# Patient Record
Sex: FEMALE | Race: WHITE
[De-identification: names, ages, dates, MRNs, and addresses within clinical notes are randomized per-mention and may not be internally consistent; named-entity substitution may affect disease eponyms.]

---

## 2018-04-09 ENCOUNTER — HOSPITAL ENCOUNTER (EMERGENCY)
Dept: HOSPITAL 17 - NED | Age: 53
Discharge: LEFT BEFORE DISCHARGE | End: 2018-04-09
Payer: COMMERCIAL

## 2018-04-09 VITALS — WEIGHT: 130.27 LBS | HEIGHT: 70 IN | BODY MASS INDEX: 18.65 KG/M2

## 2018-04-09 VITALS
TEMPERATURE: 97.8 F | OXYGEN SATURATION: 98 % | RESPIRATION RATE: 18 BRPM | SYSTOLIC BLOOD PRESSURE: 151 MMHG | HEART RATE: 114 BPM | DIASTOLIC BLOOD PRESSURE: 95 MMHG

## 2018-04-09 DIAGNOSIS — R42: ICD-10-CM

## 2018-04-09 DIAGNOSIS — M25.552: Primary | ICD-10-CM

## 2018-04-09 PROCEDURE — 99284 EMERGENCY DEPT VISIT MOD MDM: CPT

## 2018-04-09 PROCEDURE — 72170 X-RAY EXAM OF PELVIS: CPT

## 2018-04-09 PROCEDURE — 71046 X-RAY EXAM CHEST 2 VIEWS: CPT

## 2018-04-09 PROCEDURE — 73552 X-RAY EXAM OF FEMUR 2/>: CPT

## 2018-04-09 NOTE — RADRPT
EXAM DATE/TIME:  04/09/2018 19:22 

 

[facility] COMPARISON:     

No previous studies available for comparison.

 

                     

INDICATIONS :     

Syncope. 

                     

 

MEDICAL HISTORY :     

None.          

 

SURGICAL HISTORY :        

ORIF left hip.

 

ENCOUNTER:     

Initial                                        

 

ACUITY:     

1 day      

 

PAIN SCORE:     

Non-responsive.

 

LOCATION:     

Bilateral chest 

 

FINDINGS:     

PA and lateral views of the chest demonstrate the lungs to be symmetrically aerated without evidence 
of mass, infiltrate or effusion.  The cardiomediastinal contours are unremarkable.  Osseous structure
s are intact.

 

CONCLUSION:     No acute disease.  

 

 

 

 Frank Smith MD on April 09, 2018 at 20:06           

Board Certified Radiologist.

 This report was verified electronically.

## 2018-04-09 NOTE — RADRPT
EXAM DATE/TIME:  04/09/2018 19:27 

 

[facility] COMPARISON:     

No previous studies available for comparison.

 

                     

INDICATIONS :     

Left sided hip pain after fall.

                     

 

MEDICAL HISTORY :     

None.          

 

SURGICAL HISTORY :        

ORIF left hip.

 

ENCOUNTER:     

Initial                                        

 

ACUITY:     

1 day      

 

PAIN SCORE:     

4/10

 

LOCATION:       

Pelvis.

 

FINDINGS:     

A single frontal view of the pelvis demonstrates lag screw fixation proximal left femur. No acute fra
cture or dislocation. No bony destructive changes.

 

CONCLUSION:     

1. Fixation proximal left femur. No acute bony abnormality is present.

 

 

 

 Frank Smith MD on April 09, 2018 at 20:07           

Board Certified Radiologist.

 This report was verified electronically.

## 2018-04-09 NOTE — PD
Physical Exam


Date Seen by Provider:  Apr 9, 2018


Time Seen by Provider:  21:14


Narrative


52-year-old female presents to the emergency department stating that she has 

been dizzy and falling for 6 months.  She states she was seen previously at 

[facility] in December for the same.  She states she has been having left 

hip pain with history of fracture.  She is ambulatory in triage.  She states 

that her pain has been ongoing since December.  Patient states that she would 

like to be admitted and is requesting admission.  Moderate severity.  Current 

pain is 7/10.





Data


Data


Last Documented VS





Vital Signs








  Date Time  Temp Pulse Resp B/P (MAP) Pulse Ox O2 Delivery O2 Flow Rate FiO2


 


4/9/18 18:40 97.8 114 18 151/95 (113) 98   








Orders





 Orders


Complete Blood Count With Diff (4/9/18 18:46)


Comprehensive Metabolic Panel (4/9/18 18:46)


Magnesium (Mg) (4/9/18 18:46)


Ckmb (Isoenzyme) Profile (4/9/18 18:46)


Troponin I (4/9/18 18:46)


Act Partial Throm Time (Ptt) (4/9/18 18:46)


Prothrombin Time / Inr (Pt) (4/9/18 18:46)


Urinalysis - C+S If Indicated (4/9/18 18:46)


Chest, Pa & Lat (4/9/18 18:46)


Femur (Ap & Lat/2vws) (4/9/18 )


Lipase (4/9/18 18:46)


Pelvis, Ap Only (Routine) (4/9/18 )








MDM


Supervised Visit with KARYNA:  No


Narrative Course


52-year-old female presents to the emergency department for evaluation of 

dizziness and falling for 6 months.  Patient was initially seen in triage.  

Workup is initiated.  Patient did leave AGAINST MEDICAL ADVICE before she could 

be placed into a medical bed.


Diagnosis





 Primary Impression:  


 Left against medical advice


Disposition:  07 AGAINST MEDICAL ADVICE











Zora Schaefer Apr 9, 2018 21:47

## 2018-04-09 NOTE — RADRPT
EXAM DATE/TIME:  04/09/2018 19:28 

 

[facility] COMPARISON:     

No previous studies available for comparison.

 

                     

INDICATIONS :     

Left hip pain after fall.

                     

 

MEDICAL HISTORY :     

None.          

 

SURGICAL HISTORY :        

ORIF left hip.

 

ENCOUNTER:     

Initial                                        

 

ACUITY:     

1 day      

 

PAIN SCORE:     

5/10

 

LOCATION:     

Left  femur.

 

FINDINGS:     

Two view examination of the left femur demonstrates a screw fixation proximal left femur. No acute chelsea
ny abnormality.

 

CONCLUSION:     

1. Fixation proximal left femur. No acute bony abnormality.

 

 

 

 Frank Smith MD on April 09, 2018 at 20:08           

Board Certified Radiologist.

 This report was verified electronically.